# Patient Record
Sex: MALE | Race: WHITE | URBAN - METROPOLITAN AREA
[De-identification: names, ages, dates, MRNs, and addresses within clinical notes are randomized per-mention and may not be internally consistent; named-entity substitution may affect disease eponyms.]

---

## 2017-02-08 ENCOUNTER — MEDICAL CORRESPONDENCE (OUTPATIENT)
Dept: TRANSPLANT | Facility: CLINIC | Age: 57
End: 2017-02-08

## 2017-03-01 ENCOUNTER — TELEPHONE (OUTPATIENT)
Dept: TRANSPLANT | Facility: CLINIC | Age: 57
End: 2017-03-01

## 2017-04-21 NOTE — TELEPHONE ENCOUNTER
Called patient for status update and discuss lung transplant process. Multiple previous attempts made without a return call. Contacted patient's wife Shanice. Discussed referral with Shanice as patient was not well enough to talk on phone. Discussed concerns of past medical history of spinal cord crush injury in 2016 d/t accident at work. Patient's pain currently being treated with a spinal pump pain medication infusion and PO percocet PRN. Discussed pain history would make treating surgical pain post-operatively very difficult. Also discussed patient has significant renal and cardiac history to include chronic kidney disease stage III,  congestive heart failure, and aortic aneurysm, and pulmonary hypertension.  Most recent [facility] from July 2016 indicated PA pressure of 73/30. Also noted in chart is hx of DVT and current BMI of 34. Informed Shanice because of these concerns, transplant team is concerned transplant would potentially make patient's life worse. Shanice stated patient also was not convinced transplant was a good idea and not committed to moving forward. Plan made to send both patient and patient's pulmonologist letter indicating closure of referral. Provided Shanice with my contact information in the event patient had additional questions or concerns.